# Patient Record
Sex: MALE | URBAN - METROPOLITAN AREA
[De-identification: names, ages, dates, MRNs, and addresses within clinical notes are randomized per-mention and may not be internally consistent; named-entity substitution may affect disease eponyms.]

---

## 2017-09-27 ENCOUNTER — OFFICE VISIT (OUTPATIENT)
Dept: FAMILY MEDICINE | Facility: CLINIC | Age: 22
End: 2017-09-27
Payer: COMMERCIAL

## 2017-09-27 VITALS
SYSTOLIC BLOOD PRESSURE: 114 MMHG | HEART RATE: 80 BPM | HEIGHT: 72 IN | BODY MASS INDEX: 23.7 KG/M2 | WEIGHT: 175 LBS | OXYGEN SATURATION: 98 % | DIASTOLIC BLOOD PRESSURE: 68 MMHG | TEMPERATURE: 98 F | RESPIRATION RATE: 16 BRPM

## 2017-09-27 DIAGNOSIS — Z11.3 SCREEN FOR STD (SEXUALLY TRANSMITTED DISEASE): ICD-10-CM

## 2017-09-27 DIAGNOSIS — B07.8 COMMON WART: Primary | ICD-10-CM

## 2017-09-27 PROCEDURE — 87491 CHLMYD TRACH DNA AMP PROBE: CPT | Performed by: PHYSICIAN ASSISTANT

## 2017-09-27 PROCEDURE — 87591 N.GONORRHOEAE DNA AMP PROB: CPT | Performed by: PHYSICIAN ASSISTANT

## 2017-09-27 PROCEDURE — 87389 HIV-1 AG W/HIV-1&-2 AB AG IA: CPT | Performed by: PHYSICIAN ASSISTANT

## 2017-09-27 PROCEDURE — 99213 OFFICE O/P EST LOW 20 MIN: CPT | Mod: 25 | Performed by: PHYSICIAN ASSISTANT

## 2017-09-27 PROCEDURE — 87661 TRICHOMONAS VAGINALIS AMPLIF: CPT | Performed by: PHYSICIAN ASSISTANT

## 2017-09-27 PROCEDURE — 17110 DESTRUCTION B9 LES UP TO 14: CPT | Performed by: PHYSICIAN ASSISTANT

## 2017-09-27 PROCEDURE — 86780 TREPONEMA PALLIDUM: CPT | Performed by: PHYSICIAN ASSISTANT

## 2017-09-27 PROCEDURE — 36415 COLL VENOUS BLD VENIPUNCTURE: CPT | Performed by: PHYSICIAN ASSISTANT

## 2017-09-27 NOTE — MR AVS SNAPSHOT
"              After Visit Summary   9/27/2017    Nate Allen    MRN: [id number]           Patient Information     Date Of Birth          1995        Visit Information        Provider Department      9/27/2017 3:10 PM Tiki Harrington PA-C [facility]        Today's Diagnoses     Common wart    -  1    Screen for STD (sexually transmitted disease)           Follow-ups after your visit        Who to contact     If you have questions or need follow up information about today's clinic visit or your schedule please contact [facility] directly at [phone].  Normal or non-critical lab and imaging results will be communicated to you by MyChart, letter or phone within 4 business days after the clinic has received the results. If you do not hear from us within 7 days, please contact the clinic through [employer] or phone. If you have a critical or abnormal lab result, we will notify you by phone as soon as possible.  Submit refill requests through [employer] or call your pharmacy and they will forward the refill request to us. Please allow 3 business days for your refill to be completed.          Additional Information About Your Visit        MyChart Information     [employer] gives you secure access to your electronic health record. If you see a primary care provider, you can also send messages to your care team and make appointments. If you have questions, please call your primary care clinic.  If you do not have a primary care provider, please call [phone] and they will assist you.        Care EveryWhere ID     This is your Care EveryWhere ID. This could be used by other organizations to access your [place] medical records  [id number]        Your Vitals Were     Pulse Temperature Respirations Height Pulse Oximetry BMI (Body Mass Index)    80 98  F (36.7  C) 16 5' 11.75\" (1.822 m) 98% 23.9 kg/m2       Blood Pressure from Last 3 Encounters: "   09/27/17 114/68   10/09/14 118/72   10/29/13 112/69    Weight from Last 3 Encounters:   09/27/17 175 lb (79.4 kg)   10/09/14 161 lb 12.8 oz (73.4 kg)   10/29/13 165 lb 12.8 oz (75.2 kg) (68 %)*     * Growth percentiles are based on CDC 2-20 Years data.              We Performed the Following     Anti Treponema     Chlamydia trachomatis PCR     DESTRUCT BENIGN LESION, UP TO 14     HIV Antigen Antibody Combo     Neisseria gonorrhoeae PCR     Trichomonas vaginalis DNA PCR        Primary Care Provider Fax #    Physician No Ref-Primary [phone]       No address on file        Equal Access to Services     NANDA MIKE : Igor Montes De Oca, robert reyes, liliana heath, urvashi foreman. So [facility] [phone].    ATENCIÓN: Si habla español, tiene a alegria disposición servicios gratuitos de asistencia lingüística. Llame al [phone].    We comply with applicable federal civil rights laws and [place] laws. We do not discriminate on the basis of race, color, national origin, age, disability sex, sexual orientation or gender identity.            Thank you!     Thank you for choosing [facility]  for your care. Our goal is always to provide you with excellent care. Hearing back from our patients is one way we can continue to improve our services. Please take a few minutes to complete the written survey that you may receive in the mail after your visit with us. Thank you!             Your Updated Medication List - Protect others around you: Learn how to safely use, store and throw away your medicines at www.disposemymeds.org.      Notice  As of 9/27/2017  3:46 PM    You have not been prescribed any medications.

## 2017-09-27 NOTE — PROGRESS NOTES
SUBJECTIVE:   Nate Allen is a 22 year old male who presents to clinic today for the following health issues:      WART(S)      Onset: ongoing    Description (location/number): right hand and right foot    Accompanying signs and symptoms: Painful: no    History: prior warts: YES    Therapies tried and outcome: None    STD screening      Duration:     Description (location/character/radiation): pt would like full std screening    Intensity:      Accompanying signs and symptoms:     History (similar episodes/previous evaluation): None    Precipitating or alleviating factors: None    Therapies tried and outcome: None       HPI additional notes:   Chief Complaint   Patient presents with     STD     screening     Wart     Nate presents today with multiple concerns:    - Has multiple warts he would like removed, present for >6 months. Has wart on Rt 3rd finger, and three discrete warty lesions on Rt heel. Soaked in warm water at home and shaved off with blade, but warts have never gone away completely.    - Requests full STD screen. No concerns for exposure but recently had unprotected sex with new partner, so would like screening for reassurance/peace of mind.     ROS:  Skin: as above  Eyes: negative  Ears/Nose/Throat: negative  Respiratory: No shortness of breath, dyspnea on exertion, cough, or hemoptysis  Cardiovascular: negative  Gastrointestinal: negative  Genitourinary: negative  Musculoskeletal: negative  Neurologic: negative  Psychiatric: negative  Hematologic/Lymphatic/Immunologic: negative  Endocrine: negative    Chart Review:  History   Smoking Status     Smoker, Current Status Unknown   Smokeless Tobacco     Never Used       There is no problem list on file for this patient.    Past Surgical History:   Procedure Laterality Date     ORTHOPEDIC SURGERY       Problem list, Medication list, Allergies, Medical/Social/Surg hx reviewed in [employer], updated as appropriate.   OBJECTIVE:                                   "                  /68  Pulse 80  Temp 98  F (36.7  C)  Resp 16  Ht 5' 11.75\" (1.822 m)  Wt 175 lb (79.4 kg)  SpO2 98%  BMI 23.9 kg/m2  Body mass index is 23.9 kg/(m^2).  GENERAL:  WDWN, no acute distress  PSYCH: pleasant, cooperative  EYES: no discharge, no injection  HENT:  Normocephalic. Moist mucus membranes.  NECK:  Supple, symmetric  EXTREMITIES:  No gross deformities, moves all 4 limbs spontaneously  SKIN: 1) 2 mm round wart on Rt mid 3rd finger, palmar surface. 2) three discrete warts on plantar and posterior Rt heel, all approx 4x6 mm   NEUROLOGIC:  alert, sensation grossly intact.    Lesions were pared down using a #11 sterile blade. Liquid nitrogen was applied for 10 seconds x3-5  to the skin lesions. Minor bleeding on wart of Rt hand after shaving down, cauterized with liquid nitrogen treatment and covered with bandage after treatment. Patient tolerated procedure well.     Diagnostic test results: none     ASSESSMENT/PLAN:                                                          ICD-10-CM    1. Common wart B07.8 DESTRUCT BENIGN LESION, UP TO 14   2. Screen for STD (sexually transmitted disease) Z11.3 Chlamydia trachomatis PCR     Neisseria gonorrhoeae PCR     HIV Antigen Antibody Combo     Anti Treponema     Trichomonas vaginalis DNA PCR     The expected blistering/scabbing reaction was explained. Patient is not to pick or scratch at the areas. Patient reminded to expect hypopigmented scars from the procedure. Return in 2-3 weeks for repeat treatment if lesions fail to fully resolve.  At home treatment discussed in patient instructions.    STD screening as above.    Please see patient instructions for treatment details.    Follow up in 2-3 weeks if not improving as anticipated, sooner PRN.    Tiki Harrington PA-C  [facility]     "

## 2017-09-27 NOTE — NURSING NOTE
"Chief Complaint   Patient presents with     STD     screening     Wart       Initial /68  Pulse 80  Temp 98  F (36.7  C)  Resp 16  Ht 5' 11.75\" (1.822 m)  Wt 175 lb (79.4 kg)  SpO2 98%  BMI 23.9 kg/m2 Estimated body mass index is 23.9 kg/(m^2) as calculated from the following:    Height as of this encounter: 5' 11.75\" (1.822 m).    Weight as of this encounter: 175 lb (79.4 kg).  Medication Reconciliation: sarthak Das CMA      "

## 2017-09-28 LAB
HIV 1+2 AB+HIV1 P24 AG SERPL QL IA: NONREACTIVE
SPECIMEN SOURCE: NORMAL
T PALLIDUM IGG+IGM SER QL: NEGATIVE
T VAGINALIS DNA SPEC QL NAA+PROBE: NORMAL

## 2017-09-29 LAB
C TRACH DNA SPEC QL NAA+PROBE: NEGATIVE
N GONORRHOEA DNA SPEC QL NAA+PROBE: NEGATIVE
SPECIMEN SOURCE: NORMAL
SPECIMEN SOURCE: NORMAL

## 2018-05-07 ENCOUNTER — OFFICE VISIT (OUTPATIENT)
Dept: PEDIATRICS | Facility: CLINIC | Age: 23
End: 2018-05-07
Payer: COMMERCIAL

## 2018-05-07 VITALS
HEART RATE: 60 BPM | HEIGHT: 71 IN | SYSTOLIC BLOOD PRESSURE: 100 MMHG | BODY MASS INDEX: 26.07 KG/M2 | WEIGHT: 186.2 LBS | TEMPERATURE: 97.5 F | DIASTOLIC BLOOD PRESSURE: 62 MMHG | OXYGEN SATURATION: 96 %

## 2018-05-07 DIAGNOSIS — B07.9 VIRAL WARTS, UNSPECIFIED TYPE: Primary | ICD-10-CM

## 2018-05-07 PROCEDURE — 17110 DESTRUCTION B9 LES UP TO 14: CPT | Performed by: FAMILY MEDICINE

## 2018-05-07 PROCEDURE — 99207 ZZC DROP WITH A PROCEDURE: CPT | Mod: 25 | Performed by: FAMILY MEDICINE

## 2018-05-07 NOTE — MR AVS SNAPSHOT
After Visit Summary   5/7/2018    Nate Allen    MRN: [id number]           Patient Information     Date Of Birth          1995        Visit Information        Provider Department      5/7/2018 4:10 PM Micky Gallegos MD [facility]        Today's Diagnoses     Viral warts, unspecified type    -  1      Care Instructions      Understanding Plantar Warts    A plantar wart is a small noncancerous growth on the bottom of the foot. Plantar warts often develop where friction or pressure occurs, such as on the ball of the foot. The word plantar refers to the sole of the foot. Similar warts can occur on other areas of the body such as the hands. Plantar warts are more common in children and young adults.  What causes a plantar wart?  Plantar warts are caused by a virus called human papillomavirus (HPV).  They can be spread by person-to-person contact. Or you can develop one if you walk barefoot on moist surfaces infected with the virus, such as in a community pool area or locker rooms. Wearing proper footwear in such places can prevent them.  What are the symptoms of plantar warts?  Plantar warts cause a thick patch of skin on the bottom of the foot. The wart may have black dots on it. These dots are dried blood. The wart may cause pain or discomfort. You may also have trouble walking because of the pain.  How are plantar warts treated?  Many plantar warts go away without any treatment. But for those that are painful or that don t go away, several treatments are available. These include:    Salicylic acid. This treatment is applied directly to the wart. It may come in the form of a liquid, ointment, pad, or patch. It is available over the counter.    Cryotherapy.  Your healthcare provider puts liquid nitrogen on the wart with a cotton swab. This treatment can be painful.    Duct tape. One study shows a benefit by putting duct tape on the wart for 6 days. You then soak the wart and  scrape it with an emery board. This is repeated until the wart is gone or for 2 months. Other studies show this does not work well to remove the wart.    Medicine. A variety of medicines can be put on or injected into the wart. But research is mixed on how well they work.  Often your healthcare provider will cut away dead parts of the wart before also using one of these other treatments.    When should I call my healthcare provider?  Call your healthcare provider if you have plantar warts that become too painful and do not go away on their own or with over-the-counter and at home treatments.   Date Last Reviewed: 3/30/2016    4016-4225 [employer]. [address] [place] [address], [place] [address]. All rights reserved. This information is not intended as a substitute for professional medical care. Always follow your healthcare professional's instructions.        Treating Warts     You and your healthcare provider can discuss whether your warts need to be treated.     You and your healthcare provider can talk about what treatment may be best for your wart or warts. To get rid of your warts, your healthcare provider may need to try more than one type of treatment. The methods described below are often used to treat warts.  Types of treatment    Do nothing. Most warts will resolve within 2 years, even without treatment. So doing nothing is sometimes a good option. This is particularly true for smaller warts that are not causing symptoms.    Cryotherapy (liquid nitrogen). This kills skin cells by freezing them. It kills the warts and destroys skin infected by the wart-causing virus. This is done in your healthcare provider s office and will cause some discomfort. It may take several treatments over several weeks to get rid of the warts.    Topical medicines. Prescribed topical medicines can be put on the skin. These are usually applied in the healthcare provider's office. But some prescriptions may be applied at  home.    Over-the-counter (OTC) topical treatments. OTC medicines that most often contain salicylic acid may be an option. These patches, liquids, and creams are used at home. The medicine is applied daily to the wart and nearby skin. It's usually left on overnight. The dead skin is filed down the next day. In 1 to 3 days, the procedure can be repeated. Topical treatments are sometimes combined with cryotherapy.    Electrodessication and curettage (ED & C).  For this procedure, the healthcare provider applies numbing medicine to the wart. Then the wart is scraped or cut off. This type of treatment is usually not the first line of therapy.    Laser surgery.  This can vaporize wart tissue or destroy the blood vessels that feed the wart. This is done in the healthcare provider's office.    Shots (injections). These can be used to treat warts that don t respond to other treatments, such as stubborn or painful warts around the nails. This is done in the healthcare provider s office.  When to seek medical treatment  It s a good idea to have your healthcare provider check your warts. That way your provider can rule out any other skin problems. Sometimes a callous or a corn can look like a wart, but the treatments may differ. Treatment can also provide relief from warts that bleed, burn, hurt, or itch. Genital warts should always be treated. They can spread to other people through sexual contact. And they may cause genital or cervical cancer.  Getting good results  After having your warts treated, new warts may still appear. Don t be discouraged. Warts often come back. See your healthcare provider again to discuss this. Your provider can tell you about the treatments that most likely will help clear your skin of warts.   Date Last Reviewed: 2/1/2017 2000-2017 [employer]. [address] [place] [address], [place] [address]. All rights reserved. This information is not intended as a substitute for professional medical  care. Always follow your healthcare professional's instructions.        Warts (Nongenital)  Warts are caused by a skin virus. They usually appear on the hands or feet. They are harmless and usually go away in about 2 to 3 years without treatment. With treatment, they go away in 1 to 3 months.  Home care  There are several methods you can use to treat warts at home.  The overnight treatment:  1. Soak affected area in hot water for 3 to 5 minutes. Make sure to test water beforehand so that it is not scalding. You should be able to comfortably place the affected area in water.  2. Trim dead tissue with a pumice stone or other tool your healthcare provider has advised, or that you feel comfortable using.  3. Apply an over-the-counter medicine that has salicylic acid. Cover the wart with an adhesive tape.  4. Repeat every third night as tolerated the wart goes away.  The duct tape method:    Apply a small piece of duct tape to the wart for 6 days. The tape should cover the entire wart. At the end of the sixth day, remove the tape and soak in warm water. Then scrub the area gently with a pumice stone. Let the wart stay open to air overnight. This can be repeated for up to 2 months.  Banana peel:    Soak the wart until the skin is soft, and then treat skin with a pumice stone. Apply a banana peel that is slightly larger than the wart. Secure with a bandage. The banana peel will keep the skin moist. The enzymes are thought to help kill the virus that causes warts.  Warts on the hands or feet are not very contagious. This means they are not spread to others by ordinary contact. But chewing or picking at the wart can cause it to spread to other places on your own skin.  Follow-up care  Follow up with your healthcare provider, or as advised. Let your provider know if the wart does not go away after 2 months of the above treatment.  When to seek medical advice  Get medical care right away if any of the following occur:    A  wart appears on the bottom of the foot or on the genitals    Signs of infection such as redness, swelling, increased pain, or pus  Date Last Reviewed: 8/1/2016 2000-2017 [employer]. [address] [place] [address], [place] [address]. All rights reserved. This information is not intended as a substitute for professional medical care. Always follow your healthcare professional's instructions.                Follow-ups after your visit        Who to contact     If you have questions or need follow up information about today's clinic visit or your schedule please contact [facility] directly at [phone].  Normal or non-critical lab and imaging results will be communicated to you by [employer], letter or phone within 4 business days after the clinic has received the results. If you do not hear from us within 7 days, please contact the clinic through [employer] or phone. If you have a critical or abnormal lab result, we will notify you by phone as soon as possible.  Submit refill requests through [employer] or call your pharmacy and they will forward the refill request to us. Please allow 3 business days for your refill to be completed.          Additional Information About Your Visit        [employer] Information     [employer] gives you secure access to your electronic health record. If you see a primary care provider, you can also send messages to your care team and make appointments. If you have questions, please call your primary care clinic.  If you do not have a primary care provider, please call [phone] and they will assist you.      [employer] is an electronic gateway that provides easy, online access to your medical records. With [employer], you can request a clinic appointment, read your test results, renew a prescription or communicate with your care team.     To access your existing account, please contact your [facility] or call [phone] for assistance.       "  Care EveryWhere ID     This is your Care EveryWhere ID. This could be used by other organizations to access your [place] medical records  [id number]        Your Vitals Were     Pulse Temperature Height Pulse Oximetry BMI (Body Mass Index)       60 97.5  F (36.4  C) (Temporal) 5' 11\" (1.803 m) 96% 25.97 kg/m2        Blood Pressure from Last 3 Encounters:   05/07/18 100/62   09/27/17 114/68   10/09/14 118/72    Weight from Last 3 Encounters:   05/07/18 186 lb 3.2 oz (84.5 kg)   09/27/17 175 lb (79.4 kg)   10/09/14 161 lb 12.8 oz (73.4 kg)              We Performed the Following     DESTRUCT BENIGN LESION, UP TO 14        Primary Care Provider Fax #    Physician No Ref-Primary [phone]       No address on file        Equal Access to Services     [facility] : Hadii dalton Montes De Oca, waaxda eric, qaybta kaalmada kumar, urvashi perez . So [facility] [phone].    ATENCIÓN: Si habla español, tiene a alegria disposición servicios gratuitos de asistencia lingüística. Llame al [phone].    We comply with applicable federal civil rights laws and [place] laws. We do not discriminate on the basis of race, color, national origin, age, disability, sex, sexual orientation, or gender identity.            Thank you!     Thank you for choosing [facility]  for your care. Our goal is always to provide you with excellent care. Hearing back from our patients is one way we can continue to improve our services. Please take a few minutes to complete the written survey that you may receive in the mail after your visit with us. Thank you!             Your Updated Medication List - Protect others around you: Learn how to safely use, store and throw away your medicines at www.disposemymeds.org.      Notice  As of 5/7/2018  4:47 PM    You have not been prescribed any medications.      "

## 2018-05-07 NOTE — PROGRESS NOTES
"  SUBJECTIVE:   Nate Allen is a 22 year old male who presents to clinic today for the following health issues:      WART(S)  Onset: Right heel for 2 years/right middle finger 6 months    Description:   Location: Right middle finger and right foot/heel  Number of warts: 3 total  Painful: YES- finger wart gets irritated    Accompanying Signs & Symptoms:  Signs of infection: no     History:   History of trauma: no   Prior warts: YES    Therapies Tried and outcome: liquid nitrogen in the past      Problem list and histories reviewed & adjusted, as indicated.  Additional history: none    There is no problem list on file for this patient.    Past Surgical History:   Procedure Laterality Date     ORTHOPEDIC SURGERY         Social History   Substance Use Topics     Smoking status: Smoker, Current Status Unknown     Smokeless tobacco: Never Used     Alcohol use Yes     Family History   Problem Relation Age of Onset     DIABETES Paternal Grandfather      Asthma No family hx of      C.A.D. No family hx of      Hypertension No family hx of      CEREBROVASCULAR DISEASE No family hx of      CANCER No family hx of          No current outpatient prescriptions on file.     Allergies   Allergen Reactions     Poison Ivy Extract [Extract Of Poison Ivy]      Red Dye      BP Readings from Last 3 Encounters:   05/07/18 100/62   09/27/17 114/68   10/09/14 118/72    Wt Readings from Last 3 Encounters:   05/07/18 186 lb 3.2 oz (84.5 kg)   09/27/17 175 lb (79.4 kg)   10/09/14 161 lb 12.8 oz (73.4 kg)                    Reviewed and updated as needed this visit by clinical staff  Tobacco  Allergies  Meds  Med Hx  Surg Hx  Fam Hx  Soc Hx      Reviewed and updated as needed this visit by Provider         ROS:  CONSTITUTIONAL: NEGATIVE for fever, chills, change in weight  INTEGUMENTARY/SKIN: POSITIVE for warts as noted     OBJECTIVE:     /62  Pulse 60  Temp 97.5  F (36.4  C) (Temporal)  Ht 5' 11\" (1.803 m)  Wt 186 lb 3.2 oz " (84.5 kg)  SpO2 96%  BMI 25.97 kg/m2  Body mass index is 25.97 kg/(m^2).  GENERAL: healthy, alert and no distress  SKIN: no suspicious lesions or rashes but 4  warts - foot right and hands      ASSESSMENT/PLAN:           ICD-10-CM    1. Viral warts, unspecified type B07.9 DESTRUCT BENIGN LESION, UP TO 14     He requests that the warts be treated agressively and so they were all treated with 3 applications of liquid nitrogen for 20 sec and had cantrone applied directly to the sites. He is instructed in care of the sites and is to return if warts are not fully resolved in the comng month  See Patient Instructions    Micky Gallegos MD  [facility]

## 2018-05-07 NOTE — PATIENT INSTRUCTIONS
Understanding Plantar Warts    A plantar wart is a small noncancerous growth on the bottom of the foot. Plantar warts often develop where friction or pressure occurs, such as on the ball of the foot. The word plantar refers to the sole of the foot. Similar warts can occur on other areas of the body such as the hands. Plantar warts are more common in children and young adults.  What causes a plantar wart?  Plantar warts are caused by a virus called human papillomavirus (HPV).  They can be spread by person-to-person contact. Or you can develop one if you walk barefoot on moist surfaces infected with the virus, such as in a community pool area or locker rooms. Wearing proper footwear in such places can prevent them.  What are the symptoms of plantar warts?  Plantar warts cause a thick patch of skin on the bottom of the foot. The wart may have black dots on it. These dots are dried blood. The wart may cause pain or discomfort. You may also have trouble walking because of the pain.  How are plantar warts treated?  Many plantar warts go away without any treatment. But for those that are painful or that don t go away, several treatments are available. These include:    Salicylic acid. This treatment is applied directly to the wart. It may come in the form of a liquid, ointment, pad, or patch. It is available over the counter.    Cryotherapy.  Your healthcare provider puts liquid nitrogen on the wart with a cotton swab. This treatment can be painful.    Duct tape. One study shows a benefit by putting duct tape on the wart for 6 days. You then soak the wart and scrape it with an emery board. This is repeated until the wart is gone or for 2 months. Other studies show this does not work well to remove the wart.    Medicine. A variety of medicines can be put on or injected into the wart. But research is mixed on how well they work.  Often your healthcare provider will cut away dead parts of the wart before also using one of  these other treatments.    When should I call my healthcare provider?  Call your healthcare provider if you have plantar warts that become too painful and do not go away on their own or with over-the-counter and at home treatments.   Date Last Reviewed: 3/30/2016    9610-5834 [employer]. [address] [place] [address]. All rights reserved. This information is not intended as a substitute for professional medical care. Always follow your healthcare professional's instructions.        Treating Warts     You and your healthcare provider can discuss whether your warts need to be treated.     You and your healthcare provider can talk about what treatment may be best for your wart or warts. To get rid of your warts, your healthcare provider may need to try more than one type of treatment. The methods described below are often used to treat warts.  Types of treatment    Do nothing. Most warts will resolve within 2 years, even without treatment. So doing nothing is sometimes a good option. This is particularly true for smaller warts that are not causing symptoms.    Cryotherapy (liquid nitrogen). This kills skin cells by freezing them. It kills the warts and destroys skin infected by the wart-causing virus. This is done in your healthcare provider s office and will cause some discomfort. It may take several treatments over several weeks to get rid of the warts.    Topical medicines. Prescribed topical medicines can be put on the skin. These are usually applied in the healthcare provider's office. But some prescriptions may be applied at home.    Over-the-counter (OTC) topical treatments. OTC medicines that most often contain salicylic acid may be an option. These patches, liquids, and creams are used at home. The medicine is applied daily to the wart and nearby skin. It's usually left on overnight. The dead skin is filed down the next day. In 1 to 3 days, the procedure can be repeated. Topical  treatments are sometimes combined with cryotherapy.    Electrodessication and curettage (ED & C).  For this procedure, the healthcare provider applies numbing medicine to the wart. Then the wart is scraped or cut off. This type of treatment is usually not the first line of therapy.    Laser surgery.  This can vaporize wart tissue or destroy the blood vessels that feed the wart. This is done in the healthcare provider's office.    Shots (injections). These can be used to treat warts that don t respond to other treatments, such as stubborn or painful warts around the nails. This is done in the healthcare provider s office.  When to seek medical treatment  It s a good idea to have your healthcare provider check your warts. That way your provider can rule out any other skin problems. Sometimes a callous or a corn can look like a wart, but the treatments may differ. Treatment can also provide relief from warts that bleed, burn, hurt, or itch. Genital warts should always be treated. They can spread to other people through sexual contact. And they may cause genital or cervical cancer.  Getting good results  After having your warts treated, new warts may still appear. Don t be discouraged. Warts often come back. See your healthcare provider again to discuss this. Your provider can tell you about the treatments that most likely will help clear your skin of warts.   Date Last Reviewed: 2/1/2017 2000-2017 [employer]. [address] [place] [address], [place] [address]. All rights reserved. This information is not intended as a substitute for professional medical care. Always follow your healthcare professional's instructions.        Warts (Nongenital)  Warts are caused by a skin virus. They usually appear on the hands or feet. They are harmless and usually go away in about 2 to 3 years without treatment. With treatment, they go away in 1 to 3 months.  Home care  There are several methods you can use to treat warts at  home.  The overnight treatment:  1. Soak affected area in hot water for 3 to 5 minutes. Make sure to test water beforehand so that it is not scalding. You should be able to comfortably place the affected area in water.  2. Trim dead tissue with a pumice stone or other tool your healthcare provider has advised, or that you feel comfortable using.  3. Apply an over-the-counter medicine that has salicylic acid. Cover the wart with an adhesive tape.  4. Repeat every third night as tolerated the wart goes away.  The duct tape method:    Apply a small piece of duct tape to the wart for 6 days. The tape should cover the entire wart. At the end of the sixth day, remove the tape and soak in warm water. Then scrub the area gently with a pumice stone. Let the wart stay open to air overnight. This can be repeated for up to 2 months.  Banana peel:    Soak the wart until the skin is soft, and then treat skin with a pumice stone. Apply a banana peel that is slightly larger than the wart. Secure with a bandage. The banana peel will keep the skin moist. The enzymes are thought to help kill the virus that causes warts.  Warts on the hands or feet are not very contagious. This means they are not spread to others by ordinary contact. But chewing or picking at the wart can cause it to spread to other places on your own skin.  Follow-up care  Follow up with your healthcare provider, or as advised. Let your provider know if the wart does not go away after 2 months of the above treatment.  When to seek medical advice  Get medical care right away if any of the following occur:    A wart appears on the bottom of the foot or on the genitals    Signs of infection such as redness, swelling, increased pain, or pus  Date Last Reviewed: 8/1/2016 2000-2017 [employer]. [address] [place] [address], [place] [address]. All rights reserved. This information is not intended as a substitute for professional medical care. Always follow your  healthcare professional's instructions.

## 2018-07-17 ENCOUNTER — OFFICE VISIT (OUTPATIENT)
Dept: FAMILY MEDICINE | Facility: CLINIC | Age: 23
End: 2018-07-17
Payer: COMMERCIAL

## 2018-07-17 VITALS
OXYGEN SATURATION: 94 % | HEIGHT: 71 IN | BODY MASS INDEX: 26.04 KG/M2 | DIASTOLIC BLOOD PRESSURE: 73 MMHG | SYSTOLIC BLOOD PRESSURE: 120 MMHG | WEIGHT: 186 LBS | HEART RATE: 66 BPM | RESPIRATION RATE: 18 BRPM | TEMPERATURE: 97.7 F

## 2018-07-17 DIAGNOSIS — Q18.1 EAR CYSTS: ICD-10-CM

## 2018-07-17 DIAGNOSIS — B07.0 PLANTAR WARTS: Primary | ICD-10-CM

## 2018-07-17 PROCEDURE — 17110 DESTRUCTION B9 LES UP TO 14: CPT | Performed by: FAMILY MEDICINE

## 2018-07-17 PROCEDURE — 99213 OFFICE O/P EST LOW 20 MIN: CPT | Mod: 25 | Performed by: FAMILY MEDICINE

## 2018-07-17 NOTE — MR AVS SNAPSHOT
After Visit Summary   7/17/2018    Nate Allen    MRN: [id number]           Patient Information     Date Of Birth          1995        Visit Information        Provider Department      7/17/2018 3:40 PM Wegener, Joel Daniel Irwin, MD [facility]        Today's Diagnoses     Ear cysts    -  1       Follow-ups after your visit        Additional Services     OTOLARYNGOLOGY REFERRAL       Your provider has referred you to: [facility]: [facility] (Otolaryngology) - [place] [phone]  [email or web address]  [facility]: [place] Otol[facility] [phone]   [email or web address]  N: [facility] [phone] [email or web address]  N: [facility] Otolaryngology [facility] [phone]   [email or web address]    Please be aware that coverage of these services is subject to the terms and limitations of your health insurance plan.  Call member services at your health plan with any benefit or coverage questions.      Please bring the following with you to your appointment:    (1) Any X-Rays, CTs or MRIs which have been performed.  Contact the facility where they were done to arrange for  prior to your scheduled appointment.   (2) List of current medications  (3) This referral request   (4) Any documents/labs given to you for this referral                  Who to contact     If you have questions or need follow up information about today's clinic visit or your schedule please contact [facility] directly at [phone].  Normal or non-critical lab and imaging results will be communicated to you by MyChart, letter or phone within 4 business days after the clinic has received the results. If you do not hear from us within 7 days, please contact the clinic through MyChart or phone. If you have a critical or abnormal lab result, we will notify you by  "phone as soon as possible.  Submit refill requests through [employer] or call your pharmacy and they will forward the refill request to us. Please allow 3 business days for your refill to be completed.          Additional Information About Your Visit        ZarthCodehart Information     [employer] gives you secure access to your electronic health record. If you see a primary care provider, you can also send messages to your care team and make appointments. If you have questions, please call your primary care clinic.  If you do not have a primary care provider, please call [phone] and they will assist you.        Care EveryWhere ID     This is your Care EveryWhere ID. This could be used by other organizations to access your [place] medical records  [id number]        Your Vitals Were     Pulse Temperature Respirations Height Pulse Oximetry BMI (Body Mass Index)    66 97.7  F (36.5  C) (Tympanic) 18 5' 11\" (1.803 m) 94% 25.94 kg/m2       Blood Pressure from Last 3 Encounters:   07/17/18 120/73   05/07/18 100/62   09/27/17 114/68    Weight from Last 3 Encounters:   07/17/18 186 lb (84.4 kg)   05/07/18 186 lb 3.2 oz (84.5 kg)   09/27/17 175 lb (79.4 kg)              We Performed the Following     OTOLARYNGOLOGY REFERRAL        Primary Care Provider Fax #    Physician No Ref-Primary [phone]       No address on file        Equal Access to Services     [facility] RASHID : Hadii aad ku hadasho Soomaali, waaxda luqadaha, qaybta kaalmada adeegyada, urvashi perez . So [facility] [phone].    ATENCIÓN: Si habla español, tiene a alegria disposición servicios gratuitos de asistencia lingüística. Llame al [phone].    We comply with applicable federal civil rights laws and [place] laws. We do not discriminate on the basis of race, color, national origin, age, disability, sex, sexual orientation, or gender identity.            Thank you!     Thank you for choosing [facility]  for your care. Our " goal is always to provide you with excellent care. Hearing back from our patients is one way we can continue to improve our services. Please take a few minutes to complete the written survey that you may receive in the mail after your visit with us. Thank you!             Your Updated Medication List - Protect others around you: Learn how to safely use, store and throw away your medicines at www.disposemymeds.org.      Notice  As of 7/17/2018  4:40 PM    You have not been prescribed any medications.

## 2018-07-17 NOTE — PROGRESS NOTES
SUBJECTIVE:   Nate Allen is a 23 year old male who presents to clinic today for the following health issues:      WART(S)  Onset: 2 years     Description:   Location: right heel  Number of warts:   Painful: YES    Accompanying Signs & Symptoms:  Signs of infection: no     History:   History of trauma: no   Prior warts: YES    Therapies Tried and outcome: liquid nitrogen    Patient also has small lump on the right ear that he is concern.     Problem list and histories reviewed & adjusted, as indicated.         Plantar warts: desires treatment again.  Last over six months ago.  Helped for a while, never fully went away.     Ear cysts :left side.         Problem list, Medication list, Allergies, and Medical/Social/Surgical histories reviewed in [facility] and updated as appropriate.  Labs reviewed in EPIC  BP Readings from Last 3 Encounters:   07/17/18 120/73   05/07/18 100/62   09/27/17 114/68    Wt Readings from Last 3 Encounters:   07/17/18 186 lb (84.4 kg)   05/07/18 186 lb 3.2 oz (84.5 kg)   09/27/17 175 lb (79.4 kg)                  There is no problem list on file for this patient.    Past Surgical History:   Procedure Laterality Date     ORTHOPEDIC SURGERY         Social History   Substance Use Topics     Smoking status: Smoker, Current Status Unknown     Smokeless tobacco: Never Used     Alcohol use Yes     Family History   Problem Relation Age of Onset     Diabetes Paternal Grandfather      Asthma No family hx of      C.A.D. No family hx of      Hypertension No family hx of      Cerebrovascular Disease No family hx of      Cancer No family hx of          No current outpatient prescriptions on file.     Allergies   Allergen Reactions     Poison Ivy Extract [Extract Of Poison Ivy]      Red Dye      No lab results found.     ROS:  Constitutional, HEENT, cardiovascular, pulmonary, GI, , musculoskeletal, neuro, skin, endocrine and psych systems are negative, except as otherwise noted.        OBJECTIVE:  /73  "(BP Location: Right arm, Patient Position: Sitting, Cuff Size: Adult Regular)  Pulse 66  Temp 97.7  F (36.5  C) (Tympanic)  Resp 18  Ht 5' 11\" (1.803 m)  Wt 186 lb (84.4 kg)  SpO2 94%  BMI 25.94 kg/m2    EXAM:  GENERAL APPEARANCE: healthy, alert and no distress  Left ear tragus with a 3mm cyst, not red or painful    Right heel has 3 fairly large/flat verrucous areas each approx 4mm oval with associated callous.     ASSESSMENT AND PLAN  1. Plantar warts  After written and oral informed consent I shaved off callous with an 15 blade scalpel and then treated each warty area with three rounds of cryotherapy using the nitrogun.  Discussed after care.  Follow up as needed as early as 2-3 weeks.     2. Ear cysts  Referral given to eval for removal.   - OTOLARYNGOLOGY REFERRAL        MYCHART FOR ON-LINE CARE(VISITS), LABS, REFILLS, MESSAGING, ETC [email or web address]    E-VISIT: click \"on-line care, then request e-visit\".  E-visits work well for following up on issues we have discussed in clinic previously which may need new prescriptions, new prescriptions or substantial discussion. These are always done by me (Dr. Wegener).     ONCARE VISIT:  [email or web address]  - we treat nearly 50 common conditions through on-care.  These are done in an hour by on-call staff.     RADIOLOGY:  [facility]:  [phone]   [facility]: [phone]    Mammogram and Colonoscopy [phone]    Smoking Cessation: [email or web address], [phone]-PLAN ([phone])      CONSUMER PRICE LINE for estimates of test costs:  [phone]         "

## 2018-08-01 ENCOUNTER — OFFICE VISIT (OUTPATIENT)
Dept: FAMILY MEDICINE | Facility: CLINIC | Age: 23
End: 2018-08-01
Payer: COMMERCIAL

## 2018-08-01 VITALS
BODY MASS INDEX: 25.52 KG/M2 | RESPIRATION RATE: 16 BRPM | HEIGHT: 71 IN | WEIGHT: 182.25 LBS | DIASTOLIC BLOOD PRESSURE: 75 MMHG | OXYGEN SATURATION: 98 % | TEMPERATURE: 98.2 F | HEART RATE: 56 BPM | SYSTOLIC BLOOD PRESSURE: 117 MMHG

## 2018-08-01 DIAGNOSIS — B07.0 PLANTAR WARTS: Primary | ICD-10-CM

## 2018-08-01 DIAGNOSIS — F41.9 ANXIETY: ICD-10-CM

## 2018-08-01 DIAGNOSIS — F12.90 MARIJUANA USE: ICD-10-CM

## 2018-08-01 DIAGNOSIS — R00.2 PALPITATIONS: ICD-10-CM

## 2018-08-01 PROCEDURE — 99214 OFFICE O/P EST MOD 30 MIN: CPT | Mod: 25 | Performed by: FAMILY MEDICINE

## 2018-08-01 PROCEDURE — 17110 DESTRUCTION B9 LES UP TO 14: CPT | Performed by: FAMILY MEDICINE

## 2018-08-01 RX ORDER — HYDROXYZINE PAMOATE 25 MG/1
25 CAPSULE ORAL 3 TIMES DAILY PRN
Qty: 30 CAPSULE | Refills: 1 | Status: SHIPPED | OUTPATIENT
Start: 2018-08-01 | End: 2018-09-17

## 2018-08-01 ASSESSMENT — ANXIETY QUESTIONNAIRES
3. WORRYING TOO MUCH ABOUT DIFFERENT THINGS: NEARLY EVERY DAY
5. BEING SO RESTLESS THAT IT IS HARD TO SIT STILL: NOT AT ALL
1. FEELING NERVOUS, ANXIOUS, OR ON EDGE: NEARLY EVERY DAY
6. BECOMING EASILY ANNOYED OR IRRITABLE: NEARLY EVERY DAY
7. FEELING AFRAID AS IF SOMETHING AWFUL MIGHT HAPPEN: NEARLY EVERY DAY
2. NOT BEING ABLE TO STOP OR CONTROL WORRYING: NEARLY EVERY DAY
IF YOU CHECKED OFF ANY PROBLEMS ON THIS QUESTIONNAIRE, HOW DIFFICULT HAVE THESE PROBLEMS MADE IT FOR YOU TO DO YOUR WORK, TAKE CARE OF THINGS AT HOME, OR GET ALONG WITH OTHER PEOPLE: SOMEWHAT DIFFICULT
GAD7 TOTAL SCORE: 18

## 2018-08-01 ASSESSMENT — PATIENT HEALTH QUESTIONNAIRE - PHQ9: 5. POOR APPETITE OR OVEREATING: NEARLY EVERY DAY

## 2018-08-01 NOTE — MR AVS SNAPSHOT
"              After Visit Summary   8/1/2018    Nate Allen    MRN: [id number]           Patient Information     Date Of Birth          1995        Visit Information        Provider Department      8/1/2018 3:20 PM Francesco Fritz MD [facility]        Today's Diagnoses     Plantar warts    -  1    Anxiety        Marijuana use        Palpitations           Follow-ups after your visit        Who to contact     If you have questions or need follow up information about today's clinic visit or your schedule please contact [facility] directly at [phone].  Normal or non-critical lab and imaging results will be communicated to you by MyChart, letter or phone within 4 business days after the clinic has received the results. If you do not hear from us within 7 days, please contact the clinic through Inferhart or phone. If you have a critical or abnormal lab result, we will notify you by phone as soon as possible.  Submit refill requests through [employer] or call your pharmacy and they will forward the refill request to us. Please allow 3 business days for your refill to be completed.          Additional Information About Your Visit        MyChart Information     [employer] gives you secure access to your electronic health record. If you see a primary care provider, you can also send messages to your care team and make appointments. If you have questions, please call your primary care clinic.  If you do not have a primary care provider, please call [phone] and they will assist you.        Care EveryWhere ID     This is your Care EveryWhere ID. This could be used by other organizations to access your [place] medical records  [id number]        Your Vitals Were     Pulse Temperature Respirations Height Pulse Oximetry BMI (Body Mass Index)    56 98.2  F (36.8  C) (Oral) 16 5' 11\" (1.803 m) 98% 25.42 kg/m2       Blood Pressure from Last 3 Encounters:   08/01/18 117/75   07/17/18 " 120/73   05/07/18 100/62    Weight from Last 3 Encounters:   08/01/18 182 lb 4 oz (82.7 kg)   07/17/18 186 lb (84.4 kg)   05/07/18 186 lb 3.2 oz (84.5 kg)              We Performed the Following     DESTRUCT BENIGN LESION, UP TO 14          Today's Medication Changes          These changes are accurate as of 8/1/18 11:59 PM.  If you have any questions, ask your nurse or doctor.               Start taking these medicines.        Dose/Directions    hydrOXYzine 25 MG capsule   Commonly known as:  VISTARIL   Used for:  Anxiety   Started by:  Francesco Fritz MD        Dose:  25 mg   Take 1 capsule (25 mg) by mouth 3 times daily as needed for anxiety   Quantity:  30 capsule   Refills:  1            Where to get your medicines      These medications were sent to [place] [address] [place] [facility] [address] [facility]  [address] [facility] [address]     Phone:  [phone]     hydrOXYzine 25 MG capsule                Primary Care Provider Fax #    Physician No Ref-Primary [phone]       No address on file        Equal Access to Services     [facility]: Hadlex Montes De Oca, walollyda lunatalie, qaybjeremy kaalkaleb heath, urvashi perez . So [facility] [phone].    ATENCIÓN: Si habla español, tiene a alegria disposición servicios gratuitos de asistencia lingüística. Llame al [phone].    We comply with applicable federal civil rights laws and [place] laws. We do not discriminate on the basis of race, color, national origin, age, disability, sex, sexual orientation, or gender identity.            Thank you!     Thank you for choosing [facility]  for your care. Our goal is always to provide you with excellent care. Hearing back from our patients is one way we can continue to improve our services. Please take a few minutes to complete the written survey that you may receive in the mail after your visit with us. Thank you!             Your Updated  Medication List - Protect others around you: Learn how to safely use, store and throw away your medicines at www.disposemymeds.org.          This list is accurate as of 8/1/18 11:59 PM.  Always use your most recent med list.                   Brand Name Dispense Instructions for use Diagnosis    hydrOXYzine 25 MG capsule    VISTARIL    30 capsule    Take 1 capsule (25 mg) by mouth 3 times daily as needed for anxiety    Anxiety

## 2018-08-01 NOTE — PROGRESS NOTES
SUBJECTIVE:   Nate Allen is a 23 year old male who presents to clinic today for the following health issues:    WART(S)      Onset: 2 years    Description (location/number): 7 warts on right heel of foot    Accompanying signs and symptoms: Painful: YES- mild    History: prior warts: YES    Therapies tried and outcome: liquid nitrogen did not help from OV 07/17/2018    Did not help him much.  He states his metabolism is too high and he may need more than every few weeks cryotherapy treatment.  He is frustrated with his warts.    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 Palpitation. History of murmur. Was seen by cardiologist.  Heart rate is still irregular.   Light smoking. Smoking marijuana daily - more than once per day.   Was given gabapentin by psychiatrist but he did not even fill rx as he is worried about side effects.  Does not feel antidepressant may be helpful. Was given diazepam(most likely) in remote past.     Problem list and histories reviewed & adjusted, as indicated.  Additional history: as documented    Labs reviewed in EPIC    Reviewed and updated as needed this visit by clinical staff    Reviewed and updated as needed this visit by Provider      Social History     Social History     Marital status: Single     Spouse name: N/A     Number of children: N/A     Years of education: N/A     Occupational History     Not on file.     Social History Main Topics     Smoking status: Light Tobacco Smoker     Types: Cigarettes     Smokeless tobacco: Never Used     Alcohol use Yes      Comment: 15 dirnks a week       "Drug use: Yes     Special: Marijuana     Sexual activity: Yes     Partners: Male     Other Topics Concern     Parent/Sibling W/ Cabg, Mi Or Angioplasty Before 65f 55m? No     Social History Narrative     Allergies   Allergen Reactions     Poison Ivy Extract [Extract Of Poison Ivy]      Red Dye      Patient Active Problem List   Diagnosis     Anxiety     Reviewed medications, social history and  past medical and surgical history.    Review of system: for general, respiratory, CVS, GI and psychiatry negative except for noted above.     EXAM:  /75 (BP Location: Left arm, Patient Position: Sitting, Cuff Size: Adult Regular)  Pulse 56  Temp 98.2  F (36.8  C) (Oral)  Resp 16  Ht 5' 11\" (1.803 m)  Wt 182 lb 4 oz (82.7 kg)  SpO2 98%  BMI 25.42 kg/m2  Constitutional: healthy, alert and no distress   Psychiatric: mentation appears normal and affect normal/bright  Cardiovascular: RRR. No murmurs,  Respiratory: negative, Lungs clear. No crackles or wheezing. No tachypnea.   Skin - right foot - on heel and just below achills tendon - total of 3 large patches of warts.     After verbal consent, discussion of risks, benefit and complications of cryotherapy all lesions were pared with #15 blade and treated with liquid nitrogen x 3. Patient tolerated procedure well. After procedure instructions given.   Total of 3  lesions treated.       ASSESSMENT / PLAN:  (B07.0) Plantar warts  (primary encounter diagnosis)  Comment: discussed home treatment. Continue cryo every 2-3 weeks or so. Side effects discussed.   Plan: DESTRUCT BENIGN LESION, UP TO 14             (F41.9) Anxiety  Comment: I suspect significant anxiety.  Patient is not interested in using SSRI or SSRI medications.  We discussed about as needed medications and he is worried about the side effects.  He has had a history of as needed use of most likely benzodiazepine from his description. we discussed it may not be ideal monotherapy.  We discussed about Vistaril " trial and we discussed about side effect.  He would like to certainly try that.  Plan: hydrOXYzine (VISTARIL) 25 MG capsule             (F12.90) Marijuana use  Comment:    Plan: Using it regularly.  Discussed it may certainly interfere with his mood.  He is using it to relieve his anxiety.  Strongly recommended him to quit using it completely.    (R00.2) Palpitations  Comment:    Plan: I reviewed his records via care everywhere.  He was seen by cardiologist in the past.  He had had negative EKG and chest x-ray at that time.  I suspect again palpitation most likely is from anxiety.  We discussed about obtaining thyroid hemoglobin and vitamin D test but he would like to hold off on that with the possible cost concern.      Follow up: In 2-3 weeks for repeat cryotherapy.

## 2018-08-02 ASSESSMENT — ANXIETY QUESTIONNAIRES: GAD7 TOTAL SCORE: 18

## 2018-08-02 ASSESSMENT — PATIENT HEALTH QUESTIONNAIRE - PHQ9: SUM OF ALL RESPONSES TO PHQ QUESTIONS 1-9: 13

## 2019-01-18 ENCOUNTER — OFFICE VISIT (OUTPATIENT)
Dept: FAMILY MEDICINE | Facility: CLINIC | Age: 24
End: 2019-01-18
Payer: COMMERCIAL

## 2019-01-18 VITALS
HEART RATE: 62 BPM | TEMPERATURE: 97.5 F | WEIGHT: 186.75 LBS | HEIGHT: 71 IN | SYSTOLIC BLOOD PRESSURE: 126 MMHG | BODY MASS INDEX: 26.15 KG/M2 | RESPIRATION RATE: 16 BRPM | DIASTOLIC BLOOD PRESSURE: 75 MMHG | OXYGEN SATURATION: 98 %

## 2019-01-18 DIAGNOSIS — R41.840 INATTENTION: Primary | ICD-10-CM

## 2019-01-18 PROCEDURE — 99214 OFFICE O/P EST MOD 30 MIN: CPT | Performed by: FAMILY MEDICINE

## 2019-01-18 ASSESSMENT — ANXIETY QUESTIONNAIRES
7. FEELING AFRAID AS IF SOMETHING AWFUL MIGHT HAPPEN: NOT AT ALL
5. BEING SO RESTLESS THAT IT IS HARD TO SIT STILL: NOT AT ALL
1. FEELING NERVOUS, ANXIOUS, OR ON EDGE: NOT AT ALL
3. WORRYING TOO MUCH ABOUT DIFFERENT THINGS: MORE THAN HALF THE DAYS
IF YOU CHECKED OFF ANY PROBLEMS ON THIS QUESTIONNAIRE, HOW DIFFICULT HAVE THESE PROBLEMS MADE IT FOR YOU TO DO YOUR WORK, TAKE CARE OF THINGS AT HOME, OR GET ALONG WITH OTHER PEOPLE: EXTREMELY DIFFICULT
GAD7 TOTAL SCORE: 2
2. NOT BEING ABLE TO STOP OR CONTROL WORRYING: NOT AT ALL
6. BECOMING EASILY ANNOYED OR IRRITABLE: NOT AT ALL

## 2019-01-18 ASSESSMENT — PATIENT HEALTH QUESTIONNAIRE - PHQ9
5. POOR APPETITE OR OVEREATING: NOT AT ALL
SUM OF ALL RESPONSES TO PHQ QUESTIONS 1-9: 14

## 2019-01-18 ASSESSMENT — MIFFLIN-ST. JEOR: SCORE: 1859.22

## 2019-01-18 NOTE — PROGRESS NOTES
SUBJECTIVE:   Nate Allen is a 23 year old male who presents to clinic today for the following health issues:    ADHD    Onset: since childhood     Description:   Easily distracted: YES  Short attention span: YES  Trouble following directions: YES   Impulsive behavior: YES   Trouble completing tasks: YES    Accompanying Signs & Symptoms:        Change in sleep pattern: no  Irritability at certain times of the day: no  Socially withdrawn: no  Depression symptoms: YES  Anxiety symptoms: YES    History:  Caffeine intake: Small  Loss of appetite: no  Healthy diet: YES  Did you have problems in school or with previous employment: YES  Family history of ADHD: YES- 2 brothers and father   Have you had an evaluation for ADHD in the past: no  Do you use alcohol or drugs: YES- alcohol about 10 a week and and maurijuana    Therapies tried: medication not used with no relief  -   - was advised to take ADHD screen at school.   Sharp at school and able to complete work.  Brother got diagnosed with ADHD.     Anxiety - vistaril helped a lot. Saw a therapist too. Therapist noticed ADHD symptoms.     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 Hard to sit down and focus. Hard to focus full hour.   Been to psychiatrist in the past for anxiety.     No explained death in family. No family history of heart issue. History of childhood murmur. Negative cardiac workup and murmur is gone.     No smoking but sometime socially. Marijuana - once per month.   Alcohol - 10 drinks per week.     Problem list and histories reviewed & adjusted, as indicated.  Additional history: as documented    Labs reviewed in EPIC    Reviewed and updated as needed this visit by clinical staff  Tobacco  Allergies  Meds  Med Hx  Surg Hx  Fam Hx  Soc Hx      Reviewed and updated as needed this visit by Provider      Social History     Socioeconomic History     Marital status: Single     Spouse name: Not on file     Number of  "children: Not on file     Years of education: Not on file     Highest education level: Not on file   Social Needs     Financial resource strain: Not on file     Food insecurity - worry: Not on file     Food insecurity - inability: Not on file     Transportation needs - medical: Not on file     Transportation needs - non-medical: Not on file   Occupational History     Not on file   Tobacco Use     Smoking status: Light Tobacco Smoker     Types: Cigarettes     Smokeless tobacco: Never Used     Tobacco comment: less than 1 cig a week    Substance and Sexual Activity     Alcohol use: Yes     Comment: 10 dirnks a week      Drug use: Yes     Types: Marijuana     Sexual activity: Yes     Partners: Male   Other Topics Concern     Parent/sibling w/ CABG, MI or angioplasty before 65F 55M? No   Social History Narrative     Not on file     Allergies   Allergen Reactions     Poison Ivy Extract [Extract Of Poison Ivy]      Red Dye      Patient Active Problem List   Diagnosis     Anxiety     Reviewed medications, social history and  past medical and surgical history.    Review of system: for general, respiratory, CVS, GI and psychiatry negative except for noted above.     EXAM:  /75 (BP Location: Left arm, Patient Position: Sitting, Cuff Size: Adult Regular)   Pulse 62   Temp 97.5  F (36.4  C) (Oral)   Resp 16   Ht 1.803 m (5' 11\")   Wt 84.7 kg (186 lb 12 oz)   SpO2 98%   BMI 26.05 kg/m    Constitutional: healthy, alert and no distress   Psychiatric: mentation appears normal and affect normal/bright       ASSESSMENT / PLAN:  (R45.933) Inattention  (primary encounter diagnosis)  Comment: Patient has some anxiety and was seen by psychiatrist in the past.  Certainly can contribute to his inattention and possible ADHD.  We discussed he should have a psychological evaluation and we discussed treatment options.  He uses marijuana infrequently and I recommended him to quit it completely if he chooses to be on stimulants if " his psychological evaluation confirms that he is the diagnosis.  He understands that.  We also briefly discussed about controlled substance agreement and how clinic process works.  He is still not quite sure about anxiety treatment with SSRIs.  Plan: MENTAL HEALTH REFERRAL  - Adult; Assessments         and Testing; ADHD; G: [facility] [phone]; We will contact you to         schedule the appointment or please call with         any questions             Follow up: Pending psychological evaluation.    I spent > 25 minutes and more than 1/2 of the time was in counselling and coordination of care regarding above mentioned issues.      The above note was dictated using voice recognition. Although reviewed after completion, some word and grammatical error may remain .

## 2019-01-19 ASSESSMENT — ANXIETY QUESTIONNAIRES: GAD7 TOTAL SCORE: 2

## 2020-03-02 ENCOUNTER — HEALTH MAINTENANCE LETTER (OUTPATIENT)
Age: 25
End: 2020-03-02

## 2020-12-20 ENCOUNTER — HEALTH MAINTENANCE LETTER (OUTPATIENT)
Age: 25
End: 2020-12-20

## 2021-04-18 ENCOUNTER — HEALTH MAINTENANCE LETTER (OUTPATIENT)
Age: 26
End: 2021-04-18

## 2021-10-03 ENCOUNTER — HEALTH MAINTENANCE LETTER (OUTPATIENT)
Age: 26
End: 2021-10-03

## 2022-05-15 ENCOUNTER — HEALTH MAINTENANCE LETTER (OUTPATIENT)
Age: 27
End: 2022-05-15

## 2022-09-10 ENCOUNTER — HEALTH MAINTENANCE LETTER (OUTPATIENT)
Age: 27
End: 2022-09-10

## 2023-06-03 ENCOUNTER — HEALTH MAINTENANCE LETTER (OUTPATIENT)
Age: 28
End: 2023-06-03

## 2024-12-31 ENCOUNTER — HOSPITAL ENCOUNTER (EMERGENCY)
Facility: CLINIC | Age: 29
Discharge: HOME | End: 2024-12-31
Attending: EMERGENCY MEDICINE
Payer: COMMERCIAL

## 2024-12-31 VITALS
SYSTOLIC BLOOD PRESSURE: 134 MMHG | BODY MASS INDEX: 29.61 KG/M2 | OXYGEN SATURATION: 94 % | RESPIRATION RATE: 18 BRPM | WEIGHT: 211.5 LBS | HEIGHT: 71 IN | DIASTOLIC BLOOD PRESSURE: 82 MMHG | TEMPERATURE: 97.7 F | HEART RATE: 118 BPM

## 2024-12-31 DIAGNOSIS — Z20.6 HIV EXPOSURE: ICD-10-CM

## 2024-12-31 DIAGNOSIS — Z91.89 AT RISK FOR SEXUALLY TRANSMITTED DISEASE DUE TO UNPROTECTED SEX: ICD-10-CM

## 2024-12-31 LAB
ALBUMIN SERPL BCG-MCNC: 4.7 G/DL (ref 3.5–5.2)
ALP SERPL-CCNC: 59 U/L (ref 40–150)
ALT SERPL W P-5'-P-CCNC: 89 U/L (ref 0–70)
ANION GAP SERPL CALCULATED.3IONS-SCNC: 14 MMOL/L (ref 7–15)
AST SERPL W P-5'-P-CCNC: 75 U/L (ref 0–45)
BILIRUB SERPL-MCNC: 0.3 MG/DL
BUN SERPL-MCNC: 7.4 MG/DL (ref 6–20)
CALCIUM SERPL-MCNC: 9.1 MG/DL (ref 8.8–10.4)
CHLORIDE SERPL-SCNC: 102 MMOL/L (ref 98–107)
CREAT SERPL-MCNC: 0.82 MG/DL (ref 0.67–1.17)
EGFRCR SERPLBLD CKD-EPI 2021: >90 ML/MIN/1.73M2
GLUCOSE SERPL-MCNC: 110 MG/DL (ref 70–99)
HCO3 SERPL-SCNC: 24 MMOL/L (ref 22–29)
HIV 1+2 AB+HIV1 P24 AG SERPL QL IA: NONREACTIVE
POTASSIUM SERPL-SCNC: 4.1 MMOL/L (ref 3.4–5.3)
PROT SERPL-MCNC: 8 G/DL (ref 6.4–8.3)
SODIUM SERPL-SCNC: 140 MMOL/L (ref 135–145)

## 2024-12-31 PROCEDURE — 87389 HIV-1 AG W/HIV-1&-2 AB AG IA: CPT | Performed by: EMERGENCY MEDICINE

## 2024-12-31 PROCEDURE — 36415 COLL VENOUS BLD VENIPUNCTURE: CPT | Performed by: EMERGENCY MEDICINE

## 2024-12-31 PROCEDURE — 99283 EMERGENCY DEPT VISIT LOW MDM: CPT | Performed by: EMERGENCY MEDICINE

## 2024-12-31 PROCEDURE — 80053 COMPREHEN METABOLIC PANEL: CPT | Performed by: EMERGENCY MEDICINE

## 2024-12-31 PROCEDURE — 99284 EMERGENCY DEPT VISIT MOD MDM: CPT | Performed by: EMERGENCY MEDICINE

## 2024-12-31 PROCEDURE — 250N000013 HC RX MED GY IP 250 OP 250 PS 637: Performed by: EMERGENCY MEDICINE

## 2024-12-31 RX ORDER — BICTEGRAVIR SODIUM, EMTRICITABINE, AND TENOFOVIR ALAFENAMIDE FUMARATE 50; 200; 25 MG/1; MG/1; MG/1
1 TABLET ORAL DAILY
Qty: 28 TABLET | Refills: 0 | Status: SHIPPED | OUTPATIENT
Start: 2024-12-31 | End: 2025-01-28

## 2024-12-31 RX ORDER — HYDROXYZINE HYDROCHLORIDE 25 MG/1
25 TABLET, FILM COATED ORAL ONCE
Status: COMPLETED | OUTPATIENT
Start: 2024-12-31 | End: 2024-12-31

## 2024-12-31 RX ORDER — DEXTROAMPHETAMINE SACCHARATE, AMPHETAMINE ASPARTATE, DEXTROAMPHETAMINE SULFATE AND AMPHETAMINE SULFATE 5; 5; 5; 5 MG/1; MG/1; MG/1; MG/1
20 TABLET ORAL 2 TIMES DAILY
COMMUNITY

## 2024-12-31 RX ADMIN — BICTEGRAVIR SODIUM, EMTRICITABINE, AND TENOFOVIR ALAFENAMIDE FUMARATE 1 TABLET: 50; 200; 25 TABLET ORAL at 03:55

## 2024-12-31 RX ADMIN — HYDROXYZINE HYDROCHLORIDE 25 MG: 25 TABLET, FILM COATED ORAL at 02:34

## 2024-12-31 ASSESSMENT — ACTIVITIES OF DAILY LIVING (ADL)
ADLS_ACUITY_SCORE: 41
ADLS_ACUITY_SCORE: 41

## 2024-12-31 NOTE — ED NOTES
Pt discharged to home by self. Instructions and education for home provided; pt stated understanding. Pt educated on seeking additional STI screening  as well as about home prescription medications. All questions and concerns addressed prior to discharge.

## 2024-12-31 NOTE — DISCHARGE INSTRUCTIONS
Thank you for coming to the [facility] Emergency Department.     For HIV exposure:  A baseline HIV test was sent today and will be resulted tomorrow. Results will be available in the [facility].   Start Biktarvy daily for 28 days.   Avoid alcohol while taking this medication.     We recommend getting follow up HIV testing when you return home, after completing the PEP. Please also discuss getting additional STI testing for Hepatitis B, syphilis, gonorrhea, chlamydia and trichomonas as we did not test or treat for these today.   Please also discuss with you provider about considerations for PrEP.

## 2025-01-02 NOTE — ED PROVIDER NOTES
"ED Provider Note  [facility]      History     Chief Complaint   Patient presents with    Exposure to STD     Patient reports having intercourse tonight and is concerned about HIV infection.      HPI  Nate Allen is a 29 year old male who presents with unprotected sex with a new partner. Receptive oral and anal intercourse. Pt's partner later indicated a hx of HIV with with undetectable viral load (can not be confirmed in the ED). Pt is motivated to start HIV PEP.     Past Medical History  No past medical history on file.  Past Surgical History:   Procedure Laterality Date    ORTHOPEDIC SURGERY       amphetamine-dextroamphetamine (ADDERALL) 20 MG tablet  bictegravir-emtricitabine-tenofovir (BIKTARVY) -25 MG per tablet  hydrOXYzine (VISTARIL) 25 MG capsule      Allergies   Allergen Reactions    Poison Ivy Extract [Poison Ivy Extract]     Red Dye #40 (Allura Red)      Family History  Family History   Problem Relation Age of Onset    Diabetes Paternal Grandfather     Asthma No family hx of     C.A.D. No family hx of     Hypertension No family hx of     Cerebrovascular Disease No family hx of     Cancer No family hx of      Social History   Social History     Tobacco Use    Smoking status: Light Smoker     Types: Cigarettes    Smokeless tobacco: Never    Tobacco comments:     less than 1 cig a week    Substance Use Topics    Alcohol use: Yes     Comment: 10 dirnks a week     Drug use: Yes     Types: Marijuana      A medically appropriate review of systems was performed with pertinent positives and negatives noted in the HPI, and all other systems negative.    Physical Exam   BP: 134/82  Pulse: 118  Temp: 97.7  F (36.5  C)  Resp: 20  Height: 180.3 cm (5' 11\")  Weight: 95.9 kg (211 lb 8 oz)  SpO2: 94 %    Physical Exam  Gen:A&Ox3, upset  HEENT: head atraumatic, mucous membranes moist  CV:RRR without murmurs  PULM:Clear to auscultation bilaterally  Abd:soft, nontender, nondistended. " Bowel sounds present and normal  Skin: no rashes or ecchymoses    ED Course, Procedures, & Data      Procedures         Results for orders placed or performed during the hospital encounter of 12/31/24   Comprehensive metabolic panel     Status: Abnormal   Result Value Ref Range    Sodium 140 135 - 145 mmol/L    Potassium 4.1 3.4 - 5.3 mmol/L    Carbon Dioxide (CO2) 24 22 - 29 mmol/L    Anion Gap 14 7 - 15 mmol/L    Urea Nitrogen 7.4 6.0 - 20.0 mg/dL    Creatinine 0.82 0.67 - 1.17 mg/dL    GFR Estimate >90 >60 mL/min/1.73m2    Calcium 9.1 8.8 - 10.4 mg/dL    Chloride 102 98 - 107 mmol/L    Glucose 110 (H) 70 - 99 mg/dL    Alkaline Phosphatase 59 40 - 150 U/L    AST 75 (H) 0 - 45 U/L    ALT 89 (H) 0 - 70 U/L    Protein Total 8.0 6.4 - 8.3 g/dL    Albumin 4.7 3.5 - 5.2 g/dL    Bilirubin Total 0.3 <=1.2 mg/dL   HIV Antigen Antibody Combo Cascade     Status: Normal   Result Value Ref Range    HIV Antigen Antibody Combo Nonreactive Nonreactive     Medications   hydrOXYzine HCl (ATARAX) tablet 25 mg (25 mg Oral $Given 12/31/24 0866)   bictegravir-emtricitabine-tenofovir (BIKTARVY) -25 MG per tablet 1 tablet (1 tablet Oral $Given 12/31/24 5527)     Labs Ordered and Resulted from Time of ED Arrival to Time of ED Departure   COMPREHENSIVE METABOLIC PANEL - Abnormal       Result Value    Sodium 140      Potassium 4.1      Carbon Dioxide (CO2) 24      Anion Gap 14      Urea Nitrogen 7.4      Creatinine 0.82      GFR Estimate >90      Calcium 9.1      Chloride 102      Glucose 110 (*)     Alkaline Phosphatase 59      AST 75 (*)     ALT 89 (*)     Protein Total 8.0      Albumin 4.7      Bilirubin Total 0.3       No orders to display          Critical care was not performed.     Medical Decision Making  The patient's presentation was of moderate complexity (high risk STI exposure).    The patient's evaluation involved:  ordering and/or review of 2 test(s) in this encounter (see separate area of note for details)    The  patient's management necessitated moderate risk (prescription drug management including medications given in the ED).    Assessment & Plan    30 yo M presenting after unprotected sex with new partner with diagnosis of HIV.   Vitals stable other than tachycardia.   Treated with hydroxyzine for anxiety on arrival.  We discussed options for STI testing. He prefers to only do HIV testing and PEP at this time.   CMP with minimal increase in LFTs and normal Cr. Risk benefit of PEP in favor of treatment. Started on 28-day course of Biktarvy with first dose given in the ED.   Baseline HIV test sent.   Pt declined post-exposure prophylaxis for gonorrhea/chlamydia/trichomonas.   Recommended follow up with Primary Care when returning home for full STI testing and follow up HIV testing.     I have reviewed the nursing notes. I have reviewed the findings, diagnosis, plan and need for follow up with the patient.    Discharge Medication List as of 12/31/2024  3:57 AM        START taking these medications    Details   bictegravir-emtricitabine-tenofovir (BIKTARVY) -25 MG per tablet Take 1 tablet by mouth daily for 28 days., Disp-28 tablet, R-0, Local Print             Final diagnoses:   At risk for sexually transmitted disease due to unprotected sex   HIV exposure       Ursula Webster MD  [facility] EMERGENCY DEPARTMENT  12/31/2024     Ursula Webster MD  01/02/25 3182

## 2025-01-11 ENCOUNTER — HEALTH MAINTENANCE LETTER (OUTPATIENT)
Age: 30
End: 2025-01-11